# Patient Record
Sex: FEMALE | Race: BLACK OR AFRICAN AMERICAN | NOT HISPANIC OR LATINO | URBAN - METROPOLITAN AREA
[De-identification: names, ages, dates, MRNs, and addresses within clinical notes are randomized per-mention and may not be internally consistent; named-entity substitution may affect disease eponyms.]

---

## 2019-10-22 ENCOUNTER — EMERGENCY (EMERGENCY)
Facility: HOSPITAL | Age: 39
LOS: 1 days | Discharge: HOME | End: 2019-10-22
Attending: EMERGENCY MEDICINE | Admitting: EMERGENCY MEDICINE
Payer: MEDICAID

## 2019-10-22 VITALS
HEIGHT: 64 IN | RESPIRATION RATE: 18 BRPM | WEIGHT: 270.07 LBS | HEART RATE: 82 BPM | OXYGEN SATURATION: 100 % | TEMPERATURE: 98 F | DIASTOLIC BLOOD PRESSURE: 97 MMHG | SYSTOLIC BLOOD PRESSURE: 145 MMHG

## 2019-10-22 VITALS
OXYGEN SATURATION: 99 % | DIASTOLIC BLOOD PRESSURE: 83 MMHG | SYSTOLIC BLOOD PRESSURE: 121 MMHG | RESPIRATION RATE: 18 BRPM | TEMPERATURE: 98 F | HEART RATE: 83 BPM

## 2019-10-22 PROCEDURE — 96372 THER/PROPH/DIAG INJ SC/IM: CPT

## 2019-10-22 PROCEDURE — 99283 EMERGENCY DEPT VISIT LOW MDM: CPT

## 2019-10-22 PROCEDURE — 99283 EMERGENCY DEPT VISIT LOW MDM: CPT | Mod: 25

## 2019-10-22 RX ORDER — LIDOCAINE 4 G/100G
1 CREAM TOPICAL ONCE
Refills: 0 | Status: COMPLETED | OUTPATIENT
Start: 2019-10-22 | End: 2019-10-22

## 2019-10-22 RX ORDER — KETOROLAC TROMETHAMINE 30 MG/ML
30 SYRINGE (ML) INJECTION ONCE
Refills: 0 | Status: DISCONTINUED | OUTPATIENT
Start: 2019-10-22 | End: 2019-10-22

## 2019-10-22 RX ORDER — IBUPROFEN 200 MG
1 TABLET ORAL
Qty: 15 | Refills: 0
Start: 2019-10-22 | End: 2019-10-26

## 2019-10-22 RX ORDER — DIAZEPAM 5 MG
5 TABLET ORAL ONCE
Refills: 0 | Status: DISCONTINUED | OUTPATIENT
Start: 2019-10-22 | End: 2019-10-22

## 2019-10-22 RX ORDER — DIAZEPAM 5 MG
1 TABLET ORAL
Qty: 3 | Refills: 0
Start: 2019-10-22 | End: 2019-10-24

## 2019-10-22 RX ADMIN — Medication 30 MILLIGRAM(S): at 19:52

## 2019-10-22 RX ADMIN — Medication 5 MILLIGRAM(S): at 19:52

## 2019-10-22 RX ADMIN — LIDOCAINE 1 PATCH: 4 CREAM TOPICAL at 19:51

## 2019-10-22 NOTE — ED PROVIDER NOTE - OBJECTIVE STATEMENT
38 y/o F with no reported PHMx presenting with b/l neck pain x 5 days. Pt woke up with the pain, and thought she slept wrong. 4 days ago, pt received a 10 minute neck massage, which only aggravated the pain afterwards. Pt now having pain radiating into her left shoulder and left arm, worse with ROM of neck. Denies numbness or weakness of UE. No hx of neck trauma or prior neck surgeries. Pt has been taking Ibuprofen and Tylenol at home without relief.

## 2019-10-22 NOTE — ED PROVIDER NOTE - PHYSICAL EXAMINATION
VITAL SIGNS: I have reviewed nursing notes and confirm.  CONSTITUTIONAL: Well-developed; well-nourished; in no acute distress.   SKIN:  warm and dry, no acute rash.   HEAD:  normocephalic, atraumatic.  EYES: PERRL, EOM intact; conjunctiva and sclera clear.  ENT: No nasal discharge; airway clear.   NECK: Supple; moderate ttp with palpable muscle spasm, no vertebral step off or deformity; limited ROM 2/2 pain  CARD: S1, S2 normal; no murmurs, gallops, or rubs. Regular rate and rhythm.   RESP:  Clear to auscultation b/l, no wheezes, rales or rhonchi.  ABD: Normal bowel sounds; soft; non-distended; non-tender; no guarding/ rebound.  EXT: Normal ROM. No clubbing, cyanosis or edema. 2+ pulses to b/l ue/le.  NEURO: Alert, oriented, grossly unremarkable. 5/5 strength in upper extremities, sensation equal and intact.   PSYCH: Cooperative, mood and affect appropriate.

## 2019-10-22 NOTE — ED PROVIDER NOTE - CLINICAL SUMMARY MEDICAL DECISION MAKING FREE TEXT BOX
ED course unremarkable - afebrile and hemodynamically stable. No focal neuro deficits. On reassessment after heat pack, lido patch, toradol 30mg IM, and valium 5mg po, pt reports significant improvement and has improved ROM. Discussed continued supportive care at home, will give a few tabs of valium to take in addition to ibuprofen. Follow up with pmd. Return precautions given.

## 2019-10-22 NOTE — ED PROVIDER NOTE - NSFOLLOWUPINSTRUCTIONS_ED_ALL_ED_FT
Take ibuprofen 600mg up to three times daily for pain with food  Take one tab of valium at night for muscle spasm. This medication will make you sleepy so do not drive while taking it.  Apply lidocaine patch or icy hot to back of neck. This is available over the counter.  Use moist heat in area for additional relief.  Perform gentle range of motion exercises.    Return to the emergency department if you develop worsening pain, numbness or weakness of arms, or any other concerns.

## 2019-10-22 NOTE — ED ADULT NURSE NOTE - OBJECTIVE STATEMENT
as per pt " I was in a massage chair in the nail salon and it caused me to have neck pain the next day " - no sign of injury noted pt aox3 ambulatory in stable condition states the pain mostly on the Lt side of her neck and shoulders -worsening with movement

## 2019-10-22 NOTE — ED PROVIDER NOTE - PATIENT PORTAL LINK FT
You can access the FollowMyHealth Patient Portal offered by [facility] by registering at the following website: [email or web address]. By joining [facility]’s FollowMyHealth portal, you will also be able to view your health information using other applications (apps) compatible with our system.

## 2019-10-22 NOTE — ED PROVIDER NOTE - NS ED ROS FT
Constitutional: No fever. No chills.  Eyes: No redness. No discharge. No vision change.   ENT: No sore throat. No ear pain.  Cardiovascular: No chest pain. No leg swelling.  Respiratory: No cough. No shortness of breath.  GI: No abdominal pain. No vomiting. No diarrhea.   MSK: Positive neck pain, shoulder pain, arm pain. No back pain.   Skin: No rash. No abrasions.   Neuro: No numbness. No weakness.   Psych: No known mental health issues.

## 2019-10-22 NOTE — ED ADULT TRIAGE NOTE - CHIEF COMPLAINT QUOTE
Patient c.o left sided neck pain since friday , had massage saturday and pain got worse sunday . Denies any injury . Been taking tylenol and motrin with no relief .

## 2019-10-26 DIAGNOSIS — M54.2 CERVICALGIA: ICD-10-CM

## 2020-10-12 NOTE — ED ADULT NURSE NOTE - INTEGUMENTARY WDL
Refill requested: Calcitriol 0.25 mcg  Last OV:4/8/20  Next OV: 4/8/21  1. I will continue Calcitriol 0.25 mcg, 1 tablet twice daily.    Refill sent.      
Color consistent with ethnicity/race, warm, dry intact, resilient.
